# Patient Record
Sex: FEMALE | Race: WHITE | NOT HISPANIC OR LATINO | URBAN - METROPOLITAN AREA
[De-identification: names, ages, dates, MRNs, and addresses within clinical notes are randomized per-mention and may not be internally consistent; named-entity substitution may affect disease eponyms.]

---

## 2017-04-28 ENCOUNTER — APPOINTMENT (RX ONLY)
Dept: URBAN - METROPOLITAN AREA CLINIC 91 | Facility: CLINIC | Age: 67
Setting detail: DERMATOLOGY
End: 2017-04-28

## 2017-04-28 DIAGNOSIS — L91.8 OTHER HYPERTROPHIC DISORDERS OF THE SKIN: ICD-10-CM

## 2017-04-28 DIAGNOSIS — L81.4 OTHER MELANIN HYPERPIGMENTATION: ICD-10-CM

## 2017-04-28 DIAGNOSIS — L57.0 ACTINIC KERATOSIS: ICD-10-CM

## 2017-04-28 DIAGNOSIS — Z71.89 OTHER SPECIFIED COUNSELING: ICD-10-CM

## 2017-04-28 PROBLEM — I10 ESSENTIAL (PRIMARY) HYPERTENSION: Status: ACTIVE | Noted: 2017-04-28

## 2017-04-28 PROBLEM — E78.5 HYPERLIPIDEMIA, UNSPECIFIED: Status: ACTIVE | Noted: 2017-04-28

## 2017-04-28 PROBLEM — D48.5 NEOPLASM OF UNCERTAIN BEHAVIOR OF SKIN: Status: ACTIVE | Noted: 2017-04-28

## 2017-04-28 PROBLEM — H91.90 UNSPECIFIED HEARING LOSS, UNSPECIFIED EAR: Status: ACTIVE | Noted: 2017-04-28

## 2017-04-28 PROCEDURE — ? COUNSELING

## 2017-04-28 PROCEDURE — 17000 DESTRUCT PREMALG LESION: CPT

## 2017-04-28 PROCEDURE — 11100: CPT | Mod: 59

## 2017-04-28 PROCEDURE — ? BIOPSY BY SHAVE METHOD

## 2017-04-28 PROCEDURE — 99214 OFFICE O/P EST MOD 30 MIN: CPT | Mod: 25

## 2017-04-28 PROCEDURE — ? LIQUID NITROGEN

## 2017-04-28 ASSESSMENT — LOCATION DETAILED DESCRIPTION DERM
LOCATION DETAILED: LEFT RIB CAGE
LOCATION DETAILED: LEFT ANTERIOR DISTAL THIGH
LOCATION DETAILED: LEFT MEDIAL SUPERIOR CHEST

## 2017-04-28 ASSESSMENT — LOCATION SIMPLE DESCRIPTION DERM
LOCATION SIMPLE: CHEST
LOCATION SIMPLE: ABDOMEN
LOCATION SIMPLE: LEFT THIGH

## 2017-04-28 ASSESSMENT — LOCATION ZONE DERM
LOCATION ZONE: TRUNK
LOCATION ZONE: LEG

## 2017-04-28 NOTE — PROCEDURE: BIOPSY BY SHAVE METHOD
X Size Of Lesion In Cm: 0
Billing Type: [employer]
Destruction After The Procedure: No
Dressing: bandage
Cryotherapy Text: The wound bed was treated with cryotherapy after the biopsy was performed.
Electrodesiccation And Curettage Text: The wound bed was treated with electrodesiccation and curettage after the biopsy was performed.
Type Of Destruction Used: Curettage
Anesthesia Volume In Cc (Will Not Render If 0): 0.5
Electrodesiccation Text: The wound bed was treated with electrodesiccation after the biopsy was performed.
Notification Instructions: Patient will be notified of biopsy results. However, patient instructed to call the office if not contacted within 2 weeks.
Detail Level: Detailed
Wound Care: Vaseline
Biopsy Type: H and E
Anesthesia Type: 1% lidocaine with epinephrine
Biopsy Method: Dermablade
Post-care instructions reviewed with the patient in detail. The patient is to keep the biopsy site dry overnight. Remove the bandage and shower as normal with soap and water, dry the area, apply vaseline and a band-aid. Repeat this process daily for five days.
Hemostasis: Aluminum Chloride
Consent was obtained and risks were reviewed including but not limited to scarring, infection, bleeding, scabbing, incomplete removal, nerve damage and allergy to anesthesia.
Render Post-Care Instructions In Note?: yes
Size Of Lesion In Cm: 0.2
Silver Nitrate Text: The wound bed was treated with silver nitrate after the biopsy was performed.

## 2017-04-28 NOTE — PROCEDURE: LIQUID NITROGEN
Detail Level: Detailed
Number Of Freeze-Thaw Cycles: 2 freeze-thaw cycles
Consent: The patient's consent was obtained including but not limited to risks of crusting, scabbing, blistering, scarring, darker or lighter pigmentary change, recurrence, incomplete removal and infection.
Duration Of Freeze Thaw-Cycle (Seconds): 6
Render Post-Care Instructions In Note?: yes
Post-Care Instructions: I reviewed with the patient in detail post-care instructions. Patient is to wear sunprotection, and avoid picking at any of the treated lesions. Pt may apply Vaseline to crusted or scabbing areas.

## 2017-11-03 ENCOUNTER — APPOINTMENT (RX ONLY)
Dept: URBAN - METROPOLITAN AREA CLINIC 91 | Facility: CLINIC | Age: 67
Setting detail: DERMATOLOGY
End: 2017-11-03

## 2017-11-03 DIAGNOSIS — D22 MELANOCYTIC NEVI: ICD-10-CM

## 2017-11-03 DIAGNOSIS — L57.0 ACTINIC KERATOSIS: ICD-10-CM

## 2017-11-03 DIAGNOSIS — Z71.89 OTHER SPECIFIED COUNSELING: ICD-10-CM

## 2017-11-03 DIAGNOSIS — L72.8 OTHER FOLLICULAR CYSTS OF THE SKIN AND SUBCUTANEOUS TISSUE: ICD-10-CM

## 2017-11-03 PROBLEM — D22.5 MELANOCYTIC NEVI OF TRUNK: Status: ACTIVE | Noted: 2017-11-03

## 2017-11-03 PROBLEM — E78.5 HYPERLIPIDEMIA, UNSPECIFIED: Status: ACTIVE | Noted: 2017-11-03

## 2017-11-03 PROBLEM — L55.1 SUNBURN OF SECOND DEGREE: Status: ACTIVE | Noted: 2017-11-03

## 2017-11-03 PROBLEM — I10 ESSENTIAL (PRIMARY) HYPERTENSION: Status: ACTIVE | Noted: 2017-11-03

## 2017-11-03 PROCEDURE — ? LIQUID NITROGEN

## 2017-11-03 PROCEDURE — ? COUNSELING

## 2017-11-03 PROCEDURE — 99214 OFFICE O/P EST MOD 30 MIN: CPT | Mod: 25

## 2017-11-03 PROCEDURE — 17000 DESTRUCT PREMALG LESION: CPT

## 2017-11-03 ASSESSMENT — LOCATION SIMPLE DESCRIPTION DERM
LOCATION SIMPLE: LEFT LOWER BACK
LOCATION SIMPLE: LEFT UPPER BACK
LOCATION SIMPLE: LEFT CHEEK

## 2017-11-03 ASSESSMENT — LOCATION DETAILED DESCRIPTION DERM
LOCATION DETAILED: LEFT CENTRAL MALAR CHEEK
LOCATION DETAILED: LEFT SUPERIOR LATERAL MIDBACK
LOCATION DETAILED: LEFT MID-UPPER BACK

## 2017-11-03 ASSESSMENT — LOCATION ZONE DERM
LOCATION ZONE: TRUNK
LOCATION ZONE: FACE

## 2017-11-03 NOTE — PROCEDURE: LIQUID NITROGEN
Render Post-Care Instructions In Note?: yes
Duration Of Freeze Thaw-Cycle (Seconds): 6
Number Of Freeze-Thaw Cycles: 2 freeze-thaw cycles
Post-Care Instructions: I reviewed with the patient in detail post-care instructions. Patient is to wear sunprotection, and avoid picking at any of the treated lesions. Pt may apply Vaseline to crusted or scabbing areas.
Detail Level: Detailed
Consent: The patient's consent was obtained including but not limited to risks of crusting, scabbing, blistering, scarring, darker or lighter pigmentary change, recurrence, incomplete removal and infection.

## 2018-05-04 ENCOUNTER — APPOINTMENT (RX ONLY)
Dept: URBAN - METROPOLITAN AREA CLINIC 91 | Facility: CLINIC | Age: 68
Setting detail: DERMATOLOGY
End: 2018-05-04

## 2018-05-04 DIAGNOSIS — L98.8 OTHER SPECIFIED DISORDERS OF THE SKIN AND SUBCUTANEOUS TISSUE: ICD-10-CM

## 2018-05-04 DIAGNOSIS — Z71.89 OTHER SPECIFIED COUNSELING: ICD-10-CM

## 2018-05-04 DIAGNOSIS — L81.4 OTHER MELANIN HYPERPIGMENTATION: ICD-10-CM

## 2018-05-04 DIAGNOSIS — L57.0 ACTINIC KERATOSIS: ICD-10-CM

## 2018-05-04 DIAGNOSIS — D22 MELANOCYTIC NEVI: ICD-10-CM

## 2018-05-04 PROBLEM — D22.5 MELANOCYTIC NEVI OF TRUNK: Status: ACTIVE | Noted: 2018-05-04

## 2018-05-04 PROCEDURE — ? OBSERVATION

## 2018-05-04 PROCEDURE — ? COUNSELING

## 2018-05-04 PROCEDURE — 99214 OFFICE O/P EST MOD 30 MIN: CPT | Mod: 25

## 2018-05-04 PROCEDURE — ? LIQUID NITROGEN

## 2018-05-04 PROCEDURE — 17000 DESTRUCT PREMALG LESION: CPT

## 2018-05-04 ASSESSMENT — LOCATION ZONE DERM
LOCATION ZONE: ARM
LOCATION ZONE: TRUNK
LOCATION ZONE: LEG

## 2018-05-04 ASSESSMENT — LOCATION DETAILED DESCRIPTION DERM
LOCATION DETAILED: RIGHT ANKLE
LOCATION DETAILED: RIGHT SUPERIOR MEDIAL UPPER BACK
LOCATION DETAILED: LEFT PROXIMAL POSTERIOR UPPER ARM
LOCATION DETAILED: RIGHT MEDIAL UPPER BACK

## 2018-05-04 ASSESSMENT — LOCATION SIMPLE DESCRIPTION DERM
LOCATION SIMPLE: RIGHT UPPER BACK
LOCATION SIMPLE: LEFT POSTERIOR UPPER ARM
LOCATION SIMPLE: RIGHT ANKLE

## 2018-05-04 NOTE — PROCEDURE: LIQUID NITROGEN
Render Post-Care Instructions In Note?: yes
Detail Level: Detailed
Duration Of Freeze Thaw-Cycle (Seconds): 6
Post-Care Instructions: I reviewed with the patient in detail post-care instructions. Patient is to wear sunprotection, and avoid picking at any of the treated lesions. Pt may apply Vaseline to crusted or scabbing areas.
Consent: The patient's consent was obtained including but not limited to risks of crusting, scabbing, blistering, scarring, darker or lighter pigmentary change, recurrence, incomplete removal and infection.
Number Of Freeze-Thaw Cycles: 2 freeze-thaw cycles